# Patient Record
(demographics unavailable — no encounter records)

---

## 2024-10-30 NOTE — PROCEDURE
[FreeTextEntry1] : PFTs revealed normal flows; FEV1 was 3.58L, which is 89.9% of predicted; normal flow volume loop. PFTs were performed to evaluate for SOB  FENO was 36; a normal value being less than 25 Fractional exhaled nitric oxide (FENO) is regarded as a simple, noninvasive method for assessing eosinophilic airway inflammation. Produced by a variety of cells within the lung, nitric oxide (NO) concentrations are generally low in healthy individuals. However, high concentrations of NO appear to be involved in nonspecific host defense mechanisms and chronic inflammatory diseases such as asthma. The American Thor[employer] Society (ATS) therefore has recommended using FENO to aid in the diagnosis and monitoring of eosinophilic airway inflammation and asthma, and for identifying steroid responsive individuals whose chronic respiratory symptoms may be caused by airway inflammation.

## 2024-10-30 NOTE — RESULTS/DATA
[TextEntry] : Sleep study (3/30/2024) revealed severe sleep apnea with an AHI/RDI of 61.1, and a low oxygen saturation of 56%

## 2024-10-30 NOTE — PHYSICAL EXAM
[No Acute Distress] : no acute distress [Normal Oropharynx] : normal oropharynx [III] : Mallampati Class: III [Normal Appearance] : normal appearance [No Neck Mass] : no neck mass [Normal Rate/Rhythm] : normal rate/rhythm [Normal S1, S2] : normal s1, s2 [No Murmurs] : no murmurs [No Resp Distress] : no resp distress [Clear to Auscultation Bilaterally] : clear to auscultation bilaterally [No Abnormalities] : no abnormalities [Benign] : benign [Normal Gait] : normal gait [No Clubbing] : no clubbing [No Cyanosis] : no cyanosis [No Edema] : no edema [FROM] : FROM [Normal Color/ Pigmentation] : normal color/ pigmentation [No Focal Deficits] : no focal deficits [Oriented x3] : oriented x3 [Normal Affect] : normal affect [TextBox_2] : overweight [TextBox_68] : I:E ratio 1:3; clear

## 2024-10-30 NOTE — ASSESSMENT
[FreeTextEntry1] : Mr. MILAN is a 35 year old male with a history of COVID 19 (2023), gout, cardiomyopathy, HTN, overweight- SOB, mild asthma, seasonal allergies, low vitamin D, GERD, (+)JANEL- thriving on APAP/Wegovy   His shortness of breath is multifactorial due to: -poor mechanics of breathing -out of shape -over weight -pulmonary disease   -mild intermittent asthma -?cardiac disease   -?cardiomyopathy    problem 1: Mild intermittent Asthma (allergies, GERD) -continue Albuterol via inhaler 2 puffs up to Q6H, pre-exercise PRN -continue Symbicort 160 2 inhalations BID (with illness) -Asthma is believed to be caused by inherited (genetic) and environmental factor, but its exact cause is unknown. Asthma may be triggered by allergens, lung infections, or irritants in the air. Asthma triggers are different for each person. -Inhaler technique reviewed as well as oral hygiene techniques reviewed with patient. Avoidance of cold air, extremes of temperature, rescue inhaler should be used before exercise. Order of medication reviewed with patient. Recommended use of a cool mist humidifier in the bedroom   problem 2: ?cardiac disease ?cardiomyopathy -no evidence to support cardiomyopathy at this time -recommended to continue to follow up with Cardiologist (Bobbi)- ECHO 1/2025 (pending)   Problem 3: GERD -continue Pepcid 40 mg QHS -Rule of 2s: avoid eating too much, eating too late, eating too spicy, eating two hours before bed. -Things to avoid including overeating, spicy foods, tight clothing, eating within three hours of bed, this list is not all inclusive. -For treatment of reflux, possible options discussed including diet control, H2 blockers, PPIs, as well as coating motility agents discussed as treatment options. Timing of meals and proximity of last meal to sleep were discussed. If symptoms persist, a formal gastrointestinal evaluation is needed.   Problem 4: (+)JANEL (elevated Mallampati class, poor quality sleep, snoring, neck size >17, GERD) -s/p home sleep study- severe -APAP in place, [facility] ([facility]) - tolerating well and benefiting -s/p blood work: free and total testosterone (low; all other tests WNL), iron studies, Ferritin level, and thyroid function testing -Sleep apnea is associated with adverse clinical consequences which can affect most organ systems. Cardiovascular disease risk includes arrhythmias, atrial fibrillation, hypertension, coronary artery disease, and stroke. Metabolic disorders include diabetes type 2, non-alcoholic fatty liver disease. Mood disorder especially depression; and cognitive decline especially in the elderly. Associations with chronic reflux/Barretts esophagus some but not all inclusive. -Reasons include arousal consistent with hypopnea; respiratory events most prominent in REM sleep or supine position; therefore sleep staging and body position are important for accurate diagnosis and estimation of AHI.   Problem 5: allergies  -recommended nasal saline spray  -s/p blood work (all WNL except vitamin D): asthma panel, food IgE panel, IgE level, eosinophil level, vitamin D level (low)  Problem 5A: Low Vitamin D -continue Rx -Low vitamin D levels have been associated with asthma exacerbations and increased allergic symptoms. The goal, based on recent information, is maintaining levels between 50-70 and low normal is 30. Recommended 50,000 units every two weeks to once a month depending on the level.    problem 6: poor breathing mechanics -Proper breathing techniques were reviewed with an emphasis of exhalation. Patient instructed to breathe in for 1 second and out for 4 seconds. Patient was encouraged to not talk while walking.   problem 7: overweight/ out of shape -currently on Wegovy -recommended L-Carnitine  -Weight loss, exercise, and diet control were discussed and are highly encouraged. Treatment options are given such as, aqua therapy, and contacting a nutritionist. Recommended to use the elliptical, stationary bike, less use of treadmill.   problem 8: health maintenance -recommended yearly flu shot after October 15, 2024 -recommended strep pneumonia vaccines: Prevnar-20 vaccine, followed by Pneumo vaccine 23 one year following after 65 years old. -recommended early intervention for Upper Respiratory Infections (URIs) -recommended regular osteoporosis evaluations -recommended early dermatological evaluations -recommended after the age of 50 to the age of 70, colonoscopy every 5 years   F/P in 3-4 months  He is encouraged to call with any changes, concerns, or questions

## 2024-10-30 NOTE — HISTORY OF PRESENT ILLNESS
[TextBox_4] : Mr. MILAN is a 35 year old male with a history of COVID 19 (2023), gout, cardiomyopathy, HTN, overweight, seasonal allergies presenting to the office today for a follow-up pulmonary evaluation for SOB, seasonal allergies, GERD, JANEL. His chief complaint is  -he notes feeling generally well  -he notes he's been using the APAP since his last visit. He doesn't sore, he doesn't have restless nocturnal movements, and he doesn't wake up due to nocturia -he notes his energy levels are improved -he denies feeling fatigued throughout the day -he notes he hasn't had any acid reflux, and fewer than 5 incidents of heartburn since starting Pepcid -he notes he's been on Wegovy since 3/2024, and he lost 25-30 lbs. His weight loss has plateaued, and he hasn't experienced any suppression of his appetite -he notes exercising (walking 1-2X per week), and he knows he could do more -he denies drinking enough water -he notes he's never needed the albuterol inhaler -he notes he's on allopurinol 100 mg QD, and he hasn't had a gout flare since -he notes he's concerned about having no hair on his calf muscles  -he denies any headaches, nausea, emesis, fever, chills, sweats, chest pain, chest pressure, coughing, wheezing, palpitations, diarrhea, constipation, dysphagia, vertigo, arthralgias, myalgias, leg swelling, itchy eyes, itchy ears, heartburn, or sour taste in the mouth.

## 2024-10-30 NOTE — ADDENDUM
[FreeTextEntry1] : Documented by Lakeisha Farias acting as a scribe for Dr. Chilo Bella on 10/30/2024. All medical record entries made by the Scribe were at my, Dr. Chilo Bella's, direction and personally dictated by me on 10/30/2024. I have reviewed the chart and agree that the record accurately reflects my personal performance of the history, physical exam, assessment and plan. I have also personally directed, reviewed, and agree with the discharge instructions.